# Patient Record
(demographics unavailable — no encounter records)

---

## 2024-12-19 NOTE — PHYSICAL EXAM
[General Appearance - Alert] : alert [General Appearance - In No Acute Distress] : in no acute distress [Ankle Swelling (On Exam)] : present [Ankle Swelling Bilaterally] : bilaterally  [Varicose Veins Of Lower Extremities] : bilaterally [Ankle Swelling On The Left] : moderate [1+] : left foot dorsalis pedis 1+ [Sensation] : the sensory exam was normal to light touch and pinprick [No Focal Deficits] : no focal deficits [Deep Tendon Reflexes (DTR)] : deep tendon reflexes were 2+ and symmetric [Motor Exam] : the motor exam was normal [Oriented To Time, Place, And Person] : oriented to person, place, and time [Impaired Insight] : insight and judgment were intact [Affect] : the affect was normal [Delayed in the Right Toes] : capillary refills normal in right toes [Delayed in the Left Toes] : capillary refills normal in the left toes [de-identified] : Minimal pain on palpation to left 1st metatarsal head dorsomedially where significant bony prominence noted with localized erythema, not proximally streaking. Lateral deviation of digits right 2-4 Tolerable pain on palpation at medial calcaneal tuberosity of Left.  Increased pain with the windless mechanism.  Flexible mid/hindfoot deformity noted foot passively corrected to plantigrade foot position Right Left.  Decreased range of motion in dorsiflexion Right Left. Standing Exam: Decrease in medial longitudinal arch Right and Left.  Hindfoot valgus noted Right and Left.  Forefoot abduction 'too many toes' sign Right and Left Muscle strength 5/5 all major muscle groups bilateral.    Laterally deviating digits 2-5 B/L relative to the hallux [FreeTextEntry1] : clinically mycotic with pain 1-10 resolved hyperkeratotic lesion right dorsal 5th PIPJ with no underlying opening Diffuse varicosities B/L LE No Open wounds or Ulcerations feet bilateral. No acute signs of infection feet bilateral.

## 2024-12-19 NOTE — ASSESSMENT
[FreeTextEntry1] : Discussed diagnosis and treatment with patient Discussed etiology of symptoms patient is experiencing Aseptic debridement of mycotic nails 1-5 bilateral reducing the length with a sterile nail clipper manually and reduced girth by power miley Recommended over the counter toe sleeves Cont clotrimazole 1% topical twice daily  Recommended compression stockings Discussed proper shoe gear with patient  Discussed the importance of daily foot exams and pedal hygiene Dr. Becerril monitoring the Prednisone for her arthritis  Recommended low sodium diet and leg elevation Previously given script for low-grade compression stockings (8-15 mmHg). Patient states she has great difficulty reaching down to her feet. Advised to receive help from her  or other family member to aid her Advised to bring back bloodwork results or updates on meds, tammi antigout upon next visit Discussed and demonstrated proper stretching techniques for left heel pain 2/2 plantar fasciitis Recommended over the counter inserts i.e. Powersteps Recommended outpatient PT and in-home PT. Patient declines both as she has difficulty with transportation Patient requests steroid injection, however with [employer], patient requires to exhaust conservative treatment or self-pay. Patient declines self-pay for the injection therapy. Patient to return to the office in 3 months for routine

## 2024-12-19 NOTE — REASON FOR VISIT
[Follow-Up Visit] : a follow-up visit for [Ingrown Nail] : ingrown nail [Onychomycosis] : onychomycosis [FreeTextEntry2] : foot care

## 2024-12-19 NOTE — HISTORY OF PRESENT ILLNESS
[FreeTextEntry1] : Pt states her toe nails are difficult to cut on her own .   She takes prednisone for arthritis in her hips.  Patient is taking water pills , that were prescribed from her Cardiologist, and has noticed significant improvement  Patient states she fell about 2 weeks ago and had ambulance come to help her up but did not go to the hospital due to not feeling pain in the moment. Complains of soreness on left leg and foot. Denies loss of consciousness.

## 2025-03-20 NOTE — PHYSICAL EXAM
[General Appearance - Alert] : alert [General Appearance - In No Acute Distress] : in no acute distress [Ankle Swelling (On Exam)] : present [Ankle Swelling Bilaterally] : bilaterally  [Varicose Veins Of Lower Extremities] : bilaterally [Ankle Swelling On The Left] : moderate [1+] : left foot dorsalis pedis 1+ [Sensation] : the sensory exam was normal to light touch and pinprick [No Focal Deficits] : no focal deficits [Deep Tendon Reflexes (DTR)] : deep tendon reflexes were 2+ and symmetric [Motor Exam] : the motor exam was normal [Oriented To Time, Place, And Person] : oriented to person, place, and time [Impaired Insight] : insight and judgment were intact [Affect] : the affect was normal [Delayed in the Right Toes] : capillary refills normal in right toes [Delayed in the Left Toes] : capillary refills normal in the left toes [FreeTextEntry3] : diminished pedal hair [de-identified] : Minimal pain on palpation to left 1st metatarsal head dorsomedially where significant bony prominence noted with localized erythema, not proximally streaking. Lateral deviation of digits right 2-4 Tolerable pain on palpation at medial calcaneal tuberosity of Left.  Increased pain with the windless mechanism.  Flexible mid/hindfoot deformity noted foot passively corrected to plantigrade foot position Right Left.  Decreased range of motion in dorsiflexion Right Left. Standing Exam: Decrease in medial longitudinal arch Right and Left.  Hindfoot valgus noted Right and Left.  Forefoot abduction 'too many toes' sign Right and Left Muscle strength 5/5 all major muscle groups bilateral.    Laterally deviating digits 2-5 B/L relative to the hallux [FreeTextEntry1] : clinically mycotic with pain 1-10 resolved hyperkeratotic lesion right dorsal 5th PIPJ with no underlying opening Diffuse varicosities B/L LE No Open wounds or Ulcerations feet bilateral. No acute signs of infection feet bilateral.